# Patient Record
Sex: FEMALE | Race: WHITE
[De-identification: names, ages, dates, MRNs, and addresses within clinical notes are randomized per-mention and may not be internally consistent; named-entity substitution may affect disease eponyms.]

---

## 2019-01-16 ENCOUNTER — HOSPITAL ENCOUNTER (OUTPATIENT)
Dept: HOSPITAL 92 - LABBT | Age: 84
Discharge: HOME | End: 2019-01-16
Attending: SURGERY
Payer: MEDICARE

## 2019-01-16 DIAGNOSIS — K80.20: ICD-10-CM

## 2019-01-16 DIAGNOSIS — Z01.818: Primary | ICD-10-CM

## 2019-01-16 LAB
ALBUMIN SERPL BCG-MCNC: 3.6 G/DL (ref 3.4–4.8)
ALP SERPL-CCNC: 80 U/L (ref 40–150)
ALT SERPL W P-5'-P-CCNC: 20 U/L (ref 8–55)
ANION GAP SERPL CALC-SCNC: 13 MMOL/L (ref 10–20)
AST SERPL-CCNC: 20 U/L (ref 5–34)
BASOPHILS # BLD AUTO: 0 THOU/UL (ref 0–0.2)
BASOPHILS NFR BLD AUTO: 0.4 % (ref 0–1)
BILIRUB DIRECT SERPL-MCNC: 0.4 MG/DL (ref 0.1–0.3)
BILIRUB SERPL-MCNC: 1 MG/DL (ref 0.2–1.2)
BUN SERPL-MCNC: 25 MG/DL (ref 9.8–20.1)
CALCIUM SERPL-MCNC: 9.4 MG/DL (ref 7.8–10.44)
CHLORIDE SERPL-SCNC: 102 MMOL/L (ref 98–107)
CO2 SERPL-SCNC: 29 MMOL/L (ref 23–31)
CREAT CL PREDICTED SERPL C-G-VRATE: 0 ML/MIN (ref 70–130)
EOSINOPHIL # BLD AUTO: 0.2 THOU/UL (ref 0–0.7)
EOSINOPHIL NFR BLD AUTO: 3.3 % (ref 0–10)
GLOBULIN SER CALC-MCNC: 3.2 G/DL (ref 2.4–3.5)
GLUCOSE SERPL-MCNC: 178 MG/DL (ref 83–110)
HGB BLD-MCNC: 12.6 G/DL (ref 12–16)
LYMPHOCYTES # BLD: 1.3 THOU/UL (ref 1.2–3.4)
LYMPHOCYTES NFR BLD AUTO: 24.3 % (ref 21–51)
MCH RBC QN AUTO: 28.2 PG (ref 27–31)
MCV RBC AUTO: 86.5 FL (ref 78–98)
MONOCYTES # BLD AUTO: 0.4 THOU/UL (ref 0.11–0.59)
MONOCYTES NFR BLD AUTO: 8 % (ref 0–10)
NEUTROPHILS # BLD AUTO: 3.4 THOU/UL (ref 1.4–6.5)
NEUTROPHILS NFR BLD AUTO: 64 % (ref 42–75)
PLATELET # BLD AUTO: 250 THOU/UL (ref 130–400)
POTASSIUM SERPL-SCNC: 3.6 MMOL/L (ref 3.5–5.1)
RBC # BLD AUTO: 4.45 MILL/UL (ref 4.2–5.4)
SODIUM SERPL-SCNC: 140 MMOL/L (ref 136–145)
WBC # BLD AUTO: 5.3 THOU/UL (ref 4.8–10.8)

## 2019-01-16 PROCEDURE — 93010 ELECTROCARDIOGRAM REPORT: CPT

## 2019-01-16 PROCEDURE — 80076 HEPATIC FUNCTION PANEL: CPT

## 2019-01-16 PROCEDURE — 93005 ELECTROCARDIOGRAM TRACING: CPT

## 2019-01-16 PROCEDURE — 80053 COMPREHEN METABOLIC PANEL: CPT

## 2019-01-16 PROCEDURE — 85025 COMPLETE CBC W/AUTO DIFF WBC: CPT

## 2019-01-18 ENCOUNTER — HOSPITAL ENCOUNTER (OUTPATIENT)
Dept: HOSPITAL 92 - SDC | Age: 84
Discharge: HOME | End: 2019-01-18
Attending: SURGERY
Payer: MEDICARE

## 2019-01-18 VITALS — BODY MASS INDEX: 24.7 KG/M2

## 2019-01-18 DIAGNOSIS — Z79.51: ICD-10-CM

## 2019-01-18 DIAGNOSIS — Z88.8: ICD-10-CM

## 2019-01-18 DIAGNOSIS — Z79.899: ICD-10-CM

## 2019-01-18 DIAGNOSIS — E78.00: ICD-10-CM

## 2019-01-18 DIAGNOSIS — K80.12: Primary | ICD-10-CM

## 2019-01-18 DIAGNOSIS — J45.909: ICD-10-CM

## 2019-01-18 DIAGNOSIS — Z88.1: ICD-10-CM

## 2019-01-18 PROCEDURE — 88304 TISSUE EXAM BY PATHOLOGIST: CPT

## 2019-01-18 PROCEDURE — S0028 INJECTION, FAMOTIDINE, 20 MG: HCPCS

## 2019-01-18 PROCEDURE — 0FT44ZZ RESECTION OF GALLBLADDER, PERCUTANEOUS ENDOSCOPIC APPROACH: ICD-10-PCS | Performed by: SURGERY

## 2019-01-18 NOTE — EKG
Test Reason : 

Blood Pressure : ***/*** mmHG

Vent. Rate : 074 BPM     Atrial Rate : 074 BPM

   P-R Int : 184 ms          QRS Dur : 092 ms

    QT Int : 362 ms       P-R-T Axes : 071 037 034 degrees

   QTc Int : 401 ms

 

Sinus rhythm with Possible Premature atrial complexes with Abberant conduction

Otherwise normal ECG

No previous ECGs available

Confirmed by PATRICK HUMPHREY ([id number]) on 1/18/2019 8:49:35 PM

 

Referred By:  JANE           Confirmed By:PATRICK HUMPHREY

## 2019-01-18 NOTE — OP
DATE OF PROCEDURE:  01/18/2019



PREOPERATIVE DIAGNOSIS:  Symptomatic cholelithiasis.



PROCEDURE PERFORMED:  Laparoscopic cholecystectomy.



INDICATIONS:  An 84-year-old female, who has been having episodic epigastric and

right upper quadrant pain radiating to back, associated with nausea, worse after

eating.  Ultrasound showed cholelithiasis. 



FINDINGS:  She had a single about 8 mm stone in the neck of the gallbladder.  Cystic

duct did not appear to be dilated. 



DESCRIPTION OF PROCEDURE:  After informed consent was obtained, the patient was

taken to the operating room, given general endotracheal anesthesia, placed in supine

position.  Abdomen was prepped and draped in usual fashion.  Local anesthesia

infiltrated subcutaneously and deep, and a 5 mm incision was performed left

subcostal.  A Veress needle inserted.  Drop test performed.  Pneumoperitoneum was

created to a volume of 2 L of carbon dioxide.  Utilizing a 0 degree scope and a 5 mm

trocar, direct visual entry into abdominal cavity was performed.  Pneumoperitoneum

was then created to a pressure of 15 mmHg.  She had a previous low midline incision.

 I was able to look back at the anterior abdominal wall.  There were no adhesions,

so a subumbilical midline incision was performed and a 12 mm trocar was inserted.

Then, the patient was placed in reverse Trendelenburg position and two other 5-mm

ports were placed subcostally.  The gallbladder was grasped and advanced superiorly.

 Peritoneum lysed distally to expose the cystic duct and artery in critical view.

These were triply ligated with hemoclips and divided.  The gallbladder removed from

its fossa utilizing electrocautery, removed from the umbilical port.  Hemostasis

assured.  Trocars and retractors removed.  This was done after the fascia was closed

utilizing a 0-Vicryl suture and the GraNee needle.  The skin closed with interrupted

4-0 Rapide.  Dermabond applied.  The patient tolerated the procedure well,

transferred to Recovery in good condition.  Sponge and needle count verified correct

x2. 







Job ID:  [id number]